# Patient Record
Sex: MALE | Race: WHITE
[De-identification: names, ages, dates, MRNs, and addresses within clinical notes are randomized per-mention and may not be internally consistent; named-entity substitution may affect disease eponyms.]

---

## 2019-01-26 ENCOUNTER — HOSPITAL ENCOUNTER (EMERGENCY)
Dept: HOSPITAL 95 - ER | Age: 74
Discharge: HOME | End: 2019-01-26
Payer: MEDICARE

## 2019-01-26 VITALS — WEIGHT: 175 LBS | BODY MASS INDEX: 23.19 KG/M2 | HEIGHT: 73 IN

## 2019-01-26 DIAGNOSIS — I10: ICD-10-CM

## 2019-01-26 DIAGNOSIS — R04.0: Primary | ICD-10-CM

## 2023-04-13 NOTE — NUR
Initial palliative care consult:
 
PC consult received for Chauncey.  He is a 78 year old gentleman with a history
of CVA, vasular dementia and HTN.  He was admitted on 4/12/23 from [facility]
with Covid.
 
Phone call to Dr. Madrigal's office who is pt's PCP.  They have a POLST on
file from 2/2022 that they will send to the hospital.  Faxed copy of POLST
received.  POLST states full code full treatment.  Called over to [facility] to
get an idea of pt's baseline functioning and to see if they have a POLST that
is different than Dr. Madrigal's office.  Spoke with nursing.  They have same
POLST from 2/2022 on file.  Reviewed H&P note which states that conservator
elects DNR as pt has verbalized that choice prior.
 
Nursing staff at [facility] report that pt likes to be called "."  His
occupation was a .  They report that pt is normally very quiet however he
easily makes his needs known.  He is usually incontinent of urine, continent
for bowel movements.  He ambulates unassisted by staff or any DME.  He is able
to feed himself.  He was found down on Monday night.  Tuesday he tested
positive for COVID.  Over the past couple days he has mostly spent his time in
bed with minimal PO intake.
 
He is minimally responsive according to nursing during this admission.  Will
plan to contact pt's daughter to see if a new POLST documenting current wishes
can be completed.

## 2023-04-13 NOTE — NUR
SHIFT SUMMARY
NO ACUTE CHANGES THIS SHIFT. PATIENT REMAINS VERY SOMNOLENT, OPENS EYES
OCCASSIONALY TO VERBAL STIMULI. 3.5 L VIA NC T/O SHIFT TO MAINTAIN O2 SATS
>94%. CONT PULSE OX IN [place]CE. HEART RATE MAINTAINING 80'S-90'S MOST OF SHIFT,
SPIKED 'S BUT DID NOT SUSTAIN. HYPERTENSIVE, IV HYDRALAZINE GIVEN PER
EMAR WITH GOOD RESPONSE. INCONT, ATTENDS IN PLACE AND CHANGED PRN. TURNED Q2
ALTHOUGH PATIENT WILL MOVE ABOUT IN BED AT TIMES INDEPENDENTLY. CALL LIGHT IN
REACH ALTHOUGH PATIENT HAS YET TO DEMONSTRATE USE. WILL REPORT TO ONCOMING RN
AT 1900.

## 2023-04-13 NOTE — NUR
CALL TO MD AGUDELO RN NOTIFIED DR QUEEN REGARDING PATIENTS BLOOD PRESSURE & HEART RATE.
DISCUSSED THAT HEART RATE WAS INITIALLY 126 W/ A VS CHECK, SO THIS RN PULLED
PRN LOPRESSOR TO GIVE PATIENT. UPON ASSESSMENT AT THAT TIME BEFORE GIVING,
HEART RATE WAS SUSTAINING 90'S - 101, THERFORE, DID NOT GIVE THE LOPRESSOR
D/T PERAMETERS BEING FOR PULSE >115.  BLOOD PRESSURE RECHECK AT THIS TIME
REMAINED ELEVATED. DR QUEEN STATED HE WOULD ENTER ORDERS TO ADDRESS THIS.

## 2023-04-13 NOTE — NUR
PATIENT OPENED EYES TO VERBAL STIMULI, NO RESPONSE, HAD BLANK/EMPTY STARE. O2
INCREASED TO 3L TO MAINTAIN O2 SATS >94% PER ORDERS.

## 2023-04-13 NOTE — NUR
DR QUEEN AT BEDSIDE
DISCUSSED PATIENTS CURRENT STATE WITH DR QUEEN. DISCUSSED PATIENT CONTINUES TO
BE NONRESPONSIVE ALTHOUGH DID OPEN EYES FOR THIS RN 1 TIME, BUT DOES NOT
RESPOND TO ANY STIMUL. DISCUSSED INCREASE IN O2 TO MAINTAIN THE 94% STATED IN
ORDERS. NO NEW ORDERS RECEIVED AT THIS TIME.

## 2023-04-13 NOTE — NUR
SHIFT SUMMARY
78 YR M ADMITTED ON 4/12/23 FOR COVID. DNR. PT IS NOT A&O AND HAS BEEN
UNRESPONSIVE TO QUESTIONS OR STIMULI. HIS BREATHING IS LABORED AND LAST RR WAS
30. LUNGS SOUNDS ARE CRACKLY AND WHEEZY. RT WAS CALLED TO EVALUATE PT AND
SUGGESTED A NASAL TRUMPET. ORDER WAS OBTAINED AND IT WAS PLACED BY RT. THIS
SEEMS TO HAVE HELPED WITH EASE OF BREATHING. BP AND PULSE WERE ELEVATED AND
HOSPITALIST WAS CONTACTED. PRN LOPRESSOR WAS GIVEN IV. PT IS INCONTINENT AND
IS UNABLE TO USE THE CALL LIGHT FOR ASSISTANCE.

## 2023-04-13 NOTE — NUR
ASSUMED CARE OF PATIENT
PATIENT RESTING IN BED, APPEARS TO BE SLEEPING. 2.5L O2 IN PALCE VIA NC IN
PATIENTS MOUTH D/T MOUTH BREATHING, TO MAINTAIN SATS >94%. NASAL TRUMPET TO
LEFT NARE. RESPIRATIONS APPROX 30/MIN CURRENTLY. PATIENT DOES DOES RESPOND TO
VERBAL OR PAINFUL STIMULI. NO FAMILY AT BEDSIDE.

## 2023-04-14 NOTE — NUR
SHIFT SUMMARY
PT LAYING IN BED DURING BEDSIDE ROUNDS, PT HAS NASAL TRUMPET IN LEFT NARE- NC
WITH 3.5L O2 IN MOUTH DUE TO MOUTH BREATHING, PT NON VERBAL AT THIS TIME- PT
UNABLE TO TAKE SCHEDULED METOPROLOL AT HS DUE TO UNABLE TO FOLLOW DIRECTION,
IV INFUSING WITHOUT PROBLEMS, PT REPOSITIONED Q2H- PT MAKES EYE CONTACT - PT
NON RESPONSIVE TO QUESTIONS, BED LOW POSITION , CALL LIGHT WITHIN REACH, BED
ALARM IN PLACE 0410 PT LEGS OUT OF BED - PT SPOKE AND SAID HE HAD TO GO TO
BATHROOM- ASSISTED PT TO BSC - PT SEMI DIRECTABLE, PT UNABLE TO URINATE IN
BSC, PT RETURNED TO BED WITH 2 ASSIST, PT DRANK WATER WITHOUT DIFFICULTY, PT
REFUSES TO LEAVE NASAL CANULA IN PLACE, PT SATS AT 95% ON RA - SAFETY MEASURES
IN  PLACE

## 2023-04-15 NOTE — NUR
SHIFT SUMMARY
PT RESTING QUIETLY AT DURING SHIFT REPORT, BUT AWAKE. PT HAS REMAINED MOSTLY
NONVERBAL. PT DID SAY NO TO A COUPLE OF QUESTIONS, BUT MOSTLY WILL JUST LOOK
AT YOU AND NOT RESPOND. PT HAS BEEN INCONTINENT THRU OUT THE DAY; ATTENDS
CHANGED AS NEEDED. PT REQUIRES ASSIST WITH ALL MEALS AND PO INTAKE. PT DOES
DRINK WELL, IF OFFERED FLUIDS. PT HAS REMAINED ON RM AIR SINCE START OF SHIFT;
BIOX >95%. NO S/SX'S OF DISTRESS NOTED. PT HAS MOVED AROUND IN BED SEVERAL
TIMES, GETTING LEGS OFF EOB, BUT NOT TRYING TO GET UP. PT'S DAUGHTER IN TO
VISIT FOR A WHILE EARLIER THIS AFTERNOON. BED ALARM ON FOR SAFETY. CALL LT IN
REACH, BUT UNCLEAR IF PT IS ABLE TO USE. PER REPORT, PT WITH HX OF DEMENTIA,
LIVING AT [facility].

## 2023-04-15 NOTE — NUR
SHIFT UNREMARKABLE. PT TOOK 2100 MEDICATIONS WITHOUT DIFFICULTY AND HAS SLEPT
THROUGH MOST OF REMAINDER OF SHIFT. CONTINUOUS PULSE OX HAS BEEN RUNNING
THROUGHOUT SHIFT. HR HAS A LOT OF VARIANCE, LIKELY RELATED TO NEW A-FIB. PT
HAS DENIED PAIN OR DISCOMFORT THROUGHOUT SHIFT.  PT DOES NOT SPEAK MUCH BUT
APPEARS TO UNDERSTAND QUESTIONS AND DIRECTIONS, OCCASIONALLY RESPONDS VERBALLY
(ALTHOUGH VERY SLOWLY). AOX1 THROUGHOUT SHIFT. IS NOT IMPULSIVE AND FOLLOWS
COMMANDS. PLEASANT AND COOPERATIVE WITH CARE. BED LOCKED IN LOWEST POSITION.
CALL LIGHT LEFT WITHIN REACH.

## 2023-04-16 NOTE — NUR
NO ACUTE CHANGES PT IS AOX1 AND NEEDS REPOSITIONED. PT IS A BIT IMPULSIVE AND
DOES NOT USE CALL LIGHT. BED ALARM IS IN PLACE. PT HAS BEEN SAYING SMALL
WORDS,BUT HAS NOT TALKED MUCH THIS SHIFT. INCONTENT AT THIS TIME. WILL
CONTINUE TO MONITOR.

## 2023-04-16 NOTE — NUR
SUMMARY: NO ACUTE EVENTS OVERNGIHT. PATIENT AOX1. MOSTLY NON VERBAL. AWAKE
MOST OF THE NIGHT. TURNED IN BED Q2 HOUR. VSS. PATIENT ON RA. INCONTINENT
THROUGHOUT THE NIGHT. PATIENT BREIF CHANGED EVERY 1-2 HOURS THROUGHOUT NIGHT.
FREQUENTLY OFFERED PO FLUIDS. BED ALARM ON, CALL LIGHT IN REACH.

## 2023-04-17 NOTE — NUR
PT AOX1 PT HAS BEEN IN BED MOST OF THE DAY. IS EATING BETTER COOPERATING AT
THIS TIME. PHYSICAL THERAPY WORKED WITH PT AND WAS ABLE TO GET PT UP IN CHAIR.
PT IS IMPULSIVE AT TIMES AND CHAIR ALARM IS IN PLACE. WILL CONTINUE TO
MONITOR.

## 2023-04-17 NOTE — NUR
SUMMARY: NO ACUTE EVENTS OVERNGIHT. PATIENT AOX1. MOSTLY NON VERBAL. TURNED IN
BED Q2 HOUR. VSS. PATIENT ON RA. INCONTINENT THROUGHOUT THE NIGHT. PATIENT
BREIF CHANGED EVERY 1-2 HOURS THROUGHOUT NIGHT. FREQUENTLY OFFERED PO FLUIDS.
BED ALARM ON, CALL LIGHT IN REACH.

## 2023-04-18 NOTE — NUR
DISCHARGE NOTE:
 
PT DRESSED AND PERSONAL BELONGINGS PACKED BY CNA 2 RENEE. PT RECEVIED LUNCH
BEFORE TRANSPORT ARRIVED. TRANSPORT ARRIVED 1305, PT TRASNFERED INTO WC BY
WRITER AND CNA. TRANSPORT RECVEVIED DISCHARGE PACKET AND FACESHEET. PT
TRANSPORT VIA  TO [facility] BY TRANSPORT TO [facility].

## 2023-04-18 NOTE — NUR
NIGHT SHIFT SUMMARY
 
VSS. REPOSITIONED EVERY 2 HRS, BRIEF CHECKED AND CHANGED AS NEEDED. NOTED SOME
AREAS OF BONY PROMINENCES WERE RED, - I.E. HEELS, ETC, WERE COVERED WITH
PROTECTIVE DRESSINGS. INTERMITTENT FIST CLENCHING AND LABILE AFFECT. ISOLATION
PRECAUTIONS MAINTAINED. CALL LIGHT IN REACH. RAILS UP X 3. WILL CONTINUE TO
MONITOR.